# Patient Record
Sex: FEMALE | Race: WHITE | Employment: FULL TIME | URBAN - METROPOLITAN AREA
[De-identification: names, ages, dates, MRNs, and addresses within clinical notes are randomized per-mention and may not be internally consistent; named-entity substitution may affect disease eponyms.]

---

## 2017-07-07 PROCEDURE — 88305 TISSUE EXAM BY PATHOLOGIST: CPT | Performed by: SPECIALIST

## 2017-07-10 PROCEDURE — 82607 VITAMIN B-12: CPT | Performed by: OTHER

## 2017-07-10 PROCEDURE — 36415 COLL VENOUS BLD VENIPUNCTURE: CPT | Performed by: OTHER

## 2020-02-09 ENCOUNTER — HOSPITAL ENCOUNTER (OUTPATIENT)
Age: 55
Discharge: HOME | End: 2020-02-09
Attending: FAMILY MEDICINE
Payer: COMMERCIAL

## 2020-02-09 VITALS
OXYGEN SATURATION: 98 % | TEMPERATURE: 99 F | DIASTOLIC BLOOD PRESSURE: 79 MMHG | HEART RATE: 65 BPM | RESPIRATION RATE: 18 BRPM | SYSTOLIC BLOOD PRESSURE: 128 MMHG

## 2020-02-09 DIAGNOSIS — Z20.828 EXPOSURE TO THE FLU: Primary | ICD-10-CM

## 2020-02-09 DIAGNOSIS — Z86.2 HISTORY OF IMMUNOCOMPROMISED STATE: ICD-10-CM

## 2020-02-09 PROCEDURE — 99204 OFFICE O/P NEW MOD 45 MIN: CPT

## 2020-02-09 PROCEDURE — 99213 OFFICE O/P EST LOW 20 MIN: CPT

## 2020-02-09 RX ORDER — OSELTAMIVIR PHOSPHATE 75 MG/1
75 CAPSULE ORAL DAILY
Qty: 10 CAPSULE | Refills: 0 | Status: SHIPPED | OUTPATIENT
Start: 2020-02-09 | End: 2020-02-19

## 2020-02-09 NOTE — ED PROVIDER NOTES
Patient Seen in: [address] [facility]      History   Patient presents with:   Other    Stated Complaint: fever    HPI  This is a 48-year-old female coming in with complaints of being exposed to the flu, she clearly denies fever, chills, however HEAD: Normocephalic, atraumatic  EENT: OP - wnl, moist, Nares normal  NECK: FROM, supple  LUNGS: CTAB, no RRW  CV: RRR  ABD: not distended  NEURO: Alert and oriented to person place and time  GAIT: Normal          ED Course   Labs Reviewed - No data to d

## 2020-03-18 NOTE — LETTER
----- Message from Heaven Nuñez MD sent at 3/18/2020  6:50 AM CDT -----  B12 within normal range however is on the low normal and, take B12 1000 mcg orally daily  Slightly elevated bilirubin, repeat liver enzymes with anti liver kidney antibody, chronic hepatitis panel, anti smooth muscle antibody PT PTT, and abdominal ultrasound.  These test are not urgent, can be done within a month   [facility]  [facility]  [place] [address]  [address]: [phone]  FAX: [phone]    Medical Clearance Request    Jesus Pizarro MD  [address] [facility] [place] [facility] [address]  Via Fax: [phone]    The patient listed below is scheduled for surgery and needs the following pre-op labs and/or clearance prior to surgery. The patient has been instructed to schedule an appointment with you for a pre-op physical.      Patient: Kim Osborn  : 1965    Surgeon:  Dr. Sunita Calvillo    Procedure: Wide excision melanoma left foot    Surgery Date:  TBD  Location:  [facility]    outpatient    [] Hematocrit/Hemogram [x] EKG     [x] CBC    [] Chest X-Ray    [x] CMP    [] PT/PTT    [] Urinalysis   [] MRSA Nasal Culture    [] Urinalysis with reflex  [] History and Physical    [] Urine pregnancy  [x] Medical Clearance    [] Qualitative HCG (blood) [] Cardiac Clearance      Please fax to fax number indicated above when completed. Call [phone] with any questions. Thank you for your prompt attention to these requirements.     [address] [facility]

## 2020-10-06 PROBLEM — G43.009 MIGRAINE WITHOUT AURA AND WITHOUT STATUS MIGRAINOSUS, NOT INTRACTABLE: Status: ACTIVE | Noted: 2020-10-06

## 2022-01-20 ENCOUNTER — WALK IN (OUTPATIENT)
Dept: URGENT CARE | Age: 57
End: 2022-01-20

## 2022-01-20 VITALS
DIASTOLIC BLOOD PRESSURE: 78 MMHG | RESPIRATION RATE: 16 BRPM | SYSTOLIC BLOOD PRESSURE: 122 MMHG | HEART RATE: 71 BPM | TEMPERATURE: 97.5 F | OXYGEN SATURATION: 99 %

## 2022-01-20 DIAGNOSIS — J06.9 ACUTE UPPER RESPIRATORY INFECTION, UNSPECIFIED: ICD-10-CM

## 2022-01-20 DIAGNOSIS — Z20.822 SUSPECTED COVID-19 VIRUS INFECTION: Primary | ICD-10-CM

## 2022-01-20 PROCEDURE — U0003 INFECTIOUS AGENT DETECTION BY NUCLEIC ACID (DNA OR RNA); SEVERE ACUTE RESPIRATORY SYNDROME CORONAVIRUS 2 (SARS-COV-2) (CORONAVIRUS DISEASE [COVID-19]), AMPLIFIED PROBE TECHNIQUE, MAKING USE OF HIGH THROUGHPUT TECHNOLOGIES AS DESCRIBED BY CMS-2020-01-R: HCPCS | Performed by: CLINICAL MEDICAL LABORATORY

## 2022-01-20 PROCEDURE — U0005 INFEC AGEN DETEC AMPLI PROBE: HCPCS | Performed by: CLINICAL MEDICAL LABORATORY

## 2022-01-20 PROCEDURE — U0003 INFECTIOUS AGENT DETECTION BY NUCLEIC ACID (DNA OR RNA); SEVERE ACUTE RESPIRATORY SYNDROME CORONAVIRUS 2 (SARS-COV-2) (CORONAVIRUS DISEASE [COVID-19]), AMPLIFIED PROBE TECHNIQUE, MAKING USE OF HIGH THROUGHPUT TECHNOLOGIES AS DESCRIBED BY CMS-2020-01-R: HCPCS | Performed by: FAMILY MEDICINE

## 2022-01-20 PROCEDURE — PSEU10635 2019 NOVEL CORONAVIRUS (SARS-COV-2): Performed by: CLINICAL MEDICAL LABORATORY

## 2022-01-20 PROCEDURE — 99214 OFFICE O/P EST MOD 30 MIN: CPT | Performed by: INTERNAL MEDICINE

## 2022-01-20 PROCEDURE — U0005 INFEC AGEN DETEC AMPLI PROBE: HCPCS | Performed by: FAMILY MEDICINE

## 2022-01-20 RX ORDER — CODEINE PHOSPHATE/GUAIFENESIN 10-100MG/5
5 LIQUID (ML) ORAL 3 TIMES DAILY PRN
Qty: 180 ML | Refills: 0 | Status: SHIPPED | OUTPATIENT
Start: 2022-01-20

## 2022-01-20 RX ORDER — TERIFLUNOMIDE 7 MG/1
7 TABLET, FILM COATED ORAL DAILY
COMMUNITY

## 2022-01-20 RX ORDER — CEFDINIR 300 MG/1
300 CAPSULE ORAL 2 TIMES DAILY
Qty: 20 CAPSULE | Refills: 0 | Status: SHIPPED | OUTPATIENT
Start: 2022-01-20 | End: 2022-01-30

## 2022-01-20 RX ORDER — PREDNISONE 50 MG/1
50 TABLET ORAL DAILY
Qty: 3 TABLET | Refills: 0 | Status: SHIPPED | OUTPATIENT
Start: 2022-01-20 | End: 2022-01-23

## 2022-01-21 ENCOUNTER — LAB REQUISITION (OUTPATIENT)
Dept: LAB | Age: 57
End: 2022-01-21

## 2022-01-21 DIAGNOSIS — Z20.822 CONTACT WITH AND (SUSPECTED) EXPOSURE TO COVID-19: ICD-10-CM

## 2022-01-22 LAB
SARS-COV-2 RNA RESP QL NAA+PROBE: NOT DETECTED
SARS-COV-2 RNA RESP QL NAA+PROBE: NOT DETECTED
SERVICE CMNT-IMP: NORMAL

## 2022-10-21 PROBLEM — S59.901A INJURY OF RIGHT ELBOW: Status: ACTIVE | Noted: 2018-05-08

## 2022-10-21 PROBLEM — E78.00 HIGH BLOOD CHOLESTEROL: Status: ACTIVE | Noted: 2022-10-21

## 2022-10-26 ENCOUNTER — OFFICE VISIT (OUTPATIENT)
Dept: SURGERY | Facility: CLINIC | Age: 57
End: 2022-10-26
Payer: COMMERCIAL

## 2022-10-26 VITALS
BODY MASS INDEX: 25 KG/M2 | OXYGEN SATURATION: 96 % | WEIGHT: 153 LBS | SYSTOLIC BLOOD PRESSURE: 150 MMHG | TEMPERATURE: 98 F | RESPIRATION RATE: 16 BRPM | HEART RATE: 71 BPM | DIASTOLIC BLOOD PRESSURE: 77 MMHG

## 2022-10-26 DIAGNOSIS — D03.72 MELANOMA IN SITU OF LOWER EXTREMITY, LEFT (HCC): Primary | ICD-10-CM

## 2022-10-26 DIAGNOSIS — G35 MS (MULTIPLE SCLEROSIS) (HCC): ICD-10-CM

## 2022-10-26 DIAGNOSIS — Z01.818 PRE-OP TESTING: ICD-10-CM

## 2022-10-26 PROBLEM — S59.901A INJURY OF RIGHT ELBOW: Status: RESOLVED | Noted: 2018-05-08 | Resolved: 2022-10-26

## 2022-10-26 PROCEDURE — 99243 OFF/OP CNSLTJ NEW/EST LOW 30: CPT | Performed by: SURGERY

## 2022-10-26 PROCEDURE — 3078F DIAST BP <80 MM HG: CPT | Performed by: SURGERY

## 2022-10-26 PROCEDURE — 11104 PUNCH BX SKIN SINGLE LESION: CPT | Performed by: SURGERY

## 2022-10-26 PROCEDURE — 3077F SYST BP >= 140 MM HG: CPT | Performed by: SURGERY

## 2022-10-26 PROCEDURE — 88305 TISSUE EXAM BY PATHOLOGIST: CPT | Performed by: SURGERY

## 2022-10-26 PROCEDURE — 99245 OFF/OP CONSLTJ NEW/EST HI 55: CPT | Performed by: SURGERY

## 2022-10-26 NOTE — PATIENT INSTRUCTIONS
Surgeon:              Dr. Thien Marquez. Lizabeth Cruz, PhD                                          Tel:         [phone]                                  Fax:        [phone]    Surgery/Procedure:     Reconstruction of left medial plantar foot with local tissue rearrangement, flap, possible skin graft, possible integra  1 hour for Dr. Bhumika Ardon portion, general anesthesia, outpatient, joint with Dr. Josiane Bautista of left medial plantar foot with skin graft  1.25 hours for Dr. Bhumika Ardon portion, general anesthesia, outpatient, 3 weeks after first procedure                                      Hospital:  [facility]: [address] [place], [place] [address]vd, [place], [address]           [phone]  [facility]: [address] [facility], [facility]               [phone]    1. Someone will need to drive you to and from the hospital if your procedure is outpatient. 2.Do not drink alcohol or smoke 24 hours prior to your procedure. 3. Bring a picture ID and your insurance card. 4. You will be contacted by the hospital the day before to confirm the procedure time and location. 5. The hospital will also contact you approximately one week before surgery to schedule your COVID test.     6. Do not take any herbal supplements or blood thinners at least one week before your procedure/surgery. This includes NSAID's (aspirin, baby aspirin, Motrin, Ibuprofen, Aleve, Advil, Naproxen, etc), Plavix, fish oil, vitamin E, turmeric, CoQ10, or green tea supplements, etc. *TYLENOL or acetaminophen is ok to take*    7. PRE-OPERATIVE TESTING: History and physical with medical clearance is REQUIRED within 30 days of the surgery date and is mandatory per Dr. Lizabeth Cruz. *If this is not done, your surgery will be postponed*  [address]  [facility]  CMP  EKG  Instructions regarding Teriflunomide from Dr. Gretchen Heimlich    8.  Please inform us if you develop any Covid-19 like symptoms, test positive or have been exposed for Covid- 19 prior to surgery.      Consent obtained for repair of skin defects with skin flaps and skin graft  Photos taken on 10/26/2022

## 2022-10-26 NOTE — PATIENT INSTRUCTIONS
Surgery: Wide excision melanoma left foot and reconstruction with Dr. Jan Boothe    Date of Surgery:  [facility]    Hospital:    [facility] [address] [place], [address]  Phone: [phone]    This is an Outpatient procedure. Use the provided Chlorhexadine surgical soap(instructions attached) to shower the night before and morning of your procedure. Do not apply powders, creams, lotions or deodorant after showering. Do not apply any kind of makeup and make sure to remove nail polish prior to your surgery. For faster recovery from anesthesia and surgery please follow the instructions below regarding your pre-op diet:  12 hours prior to your surgery time you are to drink one 10oz bottle of Ensure Pre-Surgery Drink. You are to have NO solid food or water after 11pm the night before your surgery EXCEPT one additional 10oz bottle of Ensure Pre-Surgery Drink. You need to finish drinking this 4 hours prior to surgery time. Take Tylenol 1000mg by mouth at the time of your second Ensure Pre-Surgery drink(4hrs prior to surgery time), unless instructed otherwise by your surgeon. Bowel Prep: No   If you take Insulin contact your primary care physician for specific instructions regarding dosing around your surgery. Do not drink alcohol or smoke tobacco products 24 hours prior to procedure. Bring your picture ID and insurance card with you. Wear comfortable clothing that can easily be removed. Preferably, something that zips, snaps, or buttons up the front. You will be contacted by the hospital  for pre-admission COVID-19 testing and the day prior to your surgery to confirm details and give you specific instructions about when and where to arrive the day of your procedure.    If you are taking blood thinners including: Plavix, Eliquis, Xarelto, Coumadin, full strength Aspirin you will need to contact the prescribing provider for specific instructions on holding these medications for your procedure. Inform your primary care physician of your surgery and ask if him/her will need to see you prior to surgery. If you develop symptoms of another illness prior to surgery please contact our office immediately. If this is an inpatient surgery, attending the Surgical Oncology Pre-operative Education Class is strongly encouraged. Email [email or web address] to RSVP or for more class information. Pre-Operative Testing  x CBC x CMP  BMP    PT, PTT, INR  UA x EKG    Chest X-Ray  Cardiac Clearance x H & P Medical Clearance     Maycol Brown MD, FACS  Chief of Surgical Oncology  Co-Medical Director, Solitario Quick  Associate Professor of Surgery    Dr. Jaon Amos nurse line: [phone]   Monday through Friday 8:00am to 4:30pm.     For Dr. Joan Amos office: [phone]/ Fax: [phone]  After hours you will reach the answering service     Central [phone]  Medical Records:   [phone]

## 2022-10-28 ENCOUNTER — TELEPHONE (OUTPATIENT)
Dept: SURGERY | Facility: CLINIC | Age: 57
End: 2022-10-28

## 2022-10-28 NOTE — TELEPHONE ENCOUNTER
PATH:  Skin, left foot, excision:  -Residual melanoma in situ.  -The margins of excision are narrowly negative for tumor (see comment). -Scar, consistent with prior procedure.  -No evidence of invasive tumor. Discussed with patient over phone.

## 2022-10-31 ENCOUNTER — TELEPHONE (OUTPATIENT)
Dept: SURGERY | Facility: CLINIC | Age: 57
End: 2022-10-31

## 2022-10-31 DIAGNOSIS — D03.72 MELANOMA IN SITU OF LOWER EXTREMITY, LEFT (HCC): Primary | ICD-10-CM

## 2022-10-31 NOTE — TELEPHONE ENCOUNTER
Calling to discuss surgery scheduling. Offered patient surgery date of 11/22/2022 at the [facility] with Dr. Ever Lazaro and Dr. Guillermina Adame. Offered a 2nd stage surgery date of 12/29/2022, also at the [facility], with Dr. Guillermina Adame. Patient agreed to location and dates.

## 2022-11-01 ENCOUNTER — TELEPHONE (OUTPATIENT)
Dept: SURGERY | Facility: CLINIC | Age: 57
End: 2022-11-01

## 2022-11-01 NOTE — TELEPHONE ENCOUNTER
Called [facility] to see if they have sent ou the slides on the patient.  Per the person in pathology they will be sending it out today,

## 2022-11-03 DIAGNOSIS — D03.72 MELANOMA IN SITU OF LOWER EXTREMITY, LEFT (HCC): Primary | ICD-10-CM

## 2022-11-08 DIAGNOSIS — D03.72 MELANOMA IN SITU OF LOWER EXTREMITY, LEFT (HCC): Primary | ICD-10-CM

## 2022-11-10 ENCOUNTER — LAB ENCOUNTER (OUTPATIENT)
Dept: LAB | Facility: HOSPITAL | Age: 57
End: 2022-11-10
Attending: SURGERY
Payer: COMMERCIAL

## 2022-11-10 ENCOUNTER — APPOINTMENT (OUTPATIENT)
Dept: LAB | Facility: HOSPITAL | Age: 57
End: 2022-11-10
Attending: SURGERY
Payer: COMMERCIAL

## 2022-11-10 DIAGNOSIS — D03.9 MELANOMA IN SITU (HCC): Primary | ICD-10-CM

## 2022-11-11 PROCEDURE — 88321 CONSLTJ&REPRT SLD PREP ELSWR: CPT

## 2022-11-20 ENCOUNTER — LAB ENCOUNTER (OUTPATIENT)
Dept: LAB | Facility: HOSPITAL | Age: 57
End: 2022-11-20
Attending: SURGERY
Payer: COMMERCIAL

## 2022-11-20 DIAGNOSIS — Z01.812 ENCOUNTER FOR PREOPERATIVE SCREENING LABORATORY TESTING FOR COVID-19 VIRUS: ICD-10-CM

## 2022-11-20 DIAGNOSIS — Z20.822 ENCOUNTER FOR PREOPERATIVE SCREENING LABORATORY TESTING FOR COVID-19 VIRUS: ICD-10-CM

## 2022-11-21 DIAGNOSIS — D03.72 MELANOMA IN SITU OF LOWER EXTREMITY, LEFT (HCC): Primary | ICD-10-CM

## 2022-11-21 LAB — SARS-COV-2 RNA RESP QL NAA+PROBE: NOT DETECTED

## 2022-11-22 ENCOUNTER — HOSPITAL ENCOUNTER (OUTPATIENT)
Facility: HOSPITAL | Age: 57
Setting detail: HOSPITAL OUTPATIENT SURGERY
Discharge: HOME | End: 2022-11-22
Attending: SURGERY | Admitting: SURGERY
Payer: COMMERCIAL

## 2022-11-22 ENCOUNTER — ANESTHESIA (OUTPATIENT)
Dept: SURGERY | Facility: HOSPITAL | Age: 57
End: 2022-11-22
Payer: COMMERCIAL

## 2022-11-22 ENCOUNTER — ANESTHESIA EVENT (OUTPATIENT)
Dept: SURGERY | Facility: HOSPITAL | Age: 57
End: 2022-11-22
Payer: COMMERCIAL

## 2022-11-22 VITALS
WEIGHT: 156.06 LBS | OXYGEN SATURATION: 93 % | TEMPERATURE: 98 F | HEART RATE: 68 BPM | DIASTOLIC BLOOD PRESSURE: 69 MMHG | RESPIRATION RATE: 16 BRPM | BODY MASS INDEX: 26 KG/M2 | SYSTOLIC BLOOD PRESSURE: 120 MMHG | HEIGHT: 65 IN

## 2022-11-22 DIAGNOSIS — Z20.822 ENCOUNTER FOR PREOPERATIVE SCREENING LABORATORY TESTING FOR COVID-19 VIRUS: Primary | ICD-10-CM

## 2022-11-22 DIAGNOSIS — Z01.812 ENCOUNTER FOR PREOPERATIVE SCREENING LABORATORY TESTING FOR COVID-19 VIRUS: Primary | ICD-10-CM

## 2022-11-22 DIAGNOSIS — D03.72 MELANOMA IN SITU OF LOWER EXTREMITY, LEFT (HCC): ICD-10-CM

## 2022-11-22 PROCEDURE — 88305 TISSUE EXAM BY PATHOLOGIST: CPT | Performed by: SURGERY

## 2022-11-22 PROCEDURE — 0JRR07Z REPLACEMENT OF LEFT FOOT SUBCUTANEOUS TISSUE AND FASCIA WITH AUTOLOGOUS TISSUE SUBSTITUTE, OPEN APPROACH: ICD-10-PCS | Performed by: SURGERY

## 2022-11-22 PROCEDURE — 0JBR0ZX EXCISION OF LEFT FOOT SUBCUTANEOUS TISSUE AND FASCIA, OPEN APPROACH, DIAGNOSTIC: ICD-10-PCS | Performed by: SURGERY

## 2022-11-22 RX ORDER — DIPHENHYDRAMINE HYDROCHLORIDE 50 MG/ML
INJECTION INTRAMUSCULAR; INTRAVENOUS AS NEEDED
Status: DISCONTINUED | OUTPATIENT
Start: 2022-11-22 | End: 2022-11-22 | Stop reason: SURG

## 2022-11-22 RX ORDER — DIPHENHYDRAMINE HYDROCHLORIDE 50 MG/ML
12.5 INJECTION INTRAMUSCULAR; INTRAVENOUS AS NEEDED
Status: DISCONTINUED | OUTPATIENT
Start: 2022-11-22 | End: 2022-11-22

## 2022-11-22 RX ORDER — ACETAMINOPHEN 500 MG
1000 TABLET ORAL ONCE AS NEEDED
Status: DISCONTINUED | OUTPATIENT
Start: 2022-11-22 | End: 2022-11-22

## 2022-11-22 RX ORDER — HYDROMORPHONE HYDROCHLORIDE 1 MG/ML
INJECTION, SOLUTION INTRAMUSCULAR; INTRAVENOUS; SUBCUTANEOUS
Status: COMPLETED
Start: 2022-11-22 | End: 2022-11-22

## 2022-11-22 RX ORDER — HYDROMORPHONE HYDROCHLORIDE 1 MG/ML
0.6 INJECTION, SOLUTION INTRAMUSCULAR; INTRAVENOUS; SUBCUTANEOUS EVERY 5 MIN PRN
Status: DISCONTINUED | OUTPATIENT
Start: 2022-11-22 | End: 2022-11-22

## 2022-11-22 RX ORDER — HYDROMORPHONE HYDROCHLORIDE 1 MG/ML
0.2 INJECTION, SOLUTION INTRAMUSCULAR; INTRAVENOUS; SUBCUTANEOUS EVERY 5 MIN PRN
Status: DISCONTINUED | OUTPATIENT
Start: 2022-11-22 | End: 2022-11-22

## 2022-11-22 RX ORDER — CEFAZOLIN SODIUM/WATER 2 G/20 ML
2 SYRINGE (ML) INTRAVENOUS ONCE
Status: COMPLETED | OUTPATIENT
Start: 2022-11-22 | End: 2022-11-22

## 2022-11-22 RX ORDER — ONDANSETRON 2 MG/ML
4 INJECTION INTRAMUSCULAR; INTRAVENOUS EVERY 6 HOURS PRN
Status: DISCONTINUED | OUTPATIENT
Start: 2022-11-22 | End: 2022-11-22

## 2022-11-22 RX ORDER — ONDANSETRON 2 MG/ML
INJECTION INTRAMUSCULAR; INTRAVENOUS AS NEEDED
Status: DISCONTINUED | OUTPATIENT
Start: 2022-11-22 | End: 2022-11-22 | Stop reason: SURG

## 2022-11-22 RX ORDER — HYDROCODONE BITARTRATE AND ACETAMINOPHEN 5; 325 MG/1; MG/1
1 TABLET ORAL ONCE AS NEEDED
Status: DISCONTINUED | OUTPATIENT
Start: 2022-11-22 | End: 2022-11-22

## 2022-11-22 RX ORDER — HYDROCODONE BITARTRATE AND ACETAMINOPHEN 5; 325 MG/1; MG/1
2 TABLET ORAL ONCE AS NEEDED
Status: DISCONTINUED | OUTPATIENT
Start: 2022-11-22 | End: 2022-11-22

## 2022-11-22 RX ORDER — SCOLOPAMINE TRANSDERMAL SYSTEM 1 MG/1
1 PATCH, EXTENDED RELEASE TRANSDERMAL ONCE
Status: DISCONTINUED | OUTPATIENT
Start: 2022-11-22 | End: 2022-11-22

## 2022-11-22 RX ORDER — ACETAMINOPHEN AND CODEINE PHOSPHATE 300; 30 MG/1; MG/1
1 TABLET ORAL EVERY 4 HOURS PRN
Qty: 10 TABLET | Refills: 0 | Status: SHIPPED | OUTPATIENT
Start: 2022-11-22

## 2022-11-22 RX ORDER — LIDOCAINE HYDROCHLORIDE 10 MG/ML
INJECTION, SOLUTION EPIDURAL; INFILTRATION; INTRACAUDAL; PERINEURAL AS NEEDED
Status: DISCONTINUED | OUTPATIENT
Start: 2022-11-22 | End: 2022-11-22 | Stop reason: SURG

## 2022-11-22 RX ORDER — DEXAMETHASONE SODIUM PHOSPHATE 4 MG/ML
VIAL (ML) INJECTION AS NEEDED
Status: DISCONTINUED | OUTPATIENT
Start: 2022-11-22 | End: 2022-11-22 | Stop reason: SURG

## 2022-11-22 RX ORDER — NALOXONE HYDROCHLORIDE 0.4 MG/ML
80 INJECTION, SOLUTION INTRAMUSCULAR; INTRAVENOUS; SUBCUTANEOUS AS NEEDED
Status: DISCONTINUED | OUTPATIENT
Start: 2022-11-22 | End: 2022-11-22

## 2022-11-22 RX ORDER — MIDAZOLAM HYDROCHLORIDE 1 MG/ML
1 INJECTION INTRAMUSCULAR; INTRAVENOUS EVERY 5 MIN PRN
Status: DISCONTINUED | OUTPATIENT
Start: 2022-11-22 | End: 2022-11-22

## 2022-11-22 RX ORDER — BUPIVACAINE HYDROCHLORIDE 2.5 MG/ML
INJECTION, SOLUTION EPIDURAL; INFILTRATION; INTRACAUDAL AS NEEDED
Status: DISCONTINUED | OUTPATIENT
Start: 2022-11-22 | End: 2022-11-22 | Stop reason: HOSPADM

## 2022-11-22 RX ORDER — SODIUM CHLORIDE, SODIUM LACTATE, POTASSIUM CHLORIDE, CALCIUM CHLORIDE 600; 310; 30; 20 MG/100ML; MG/100ML; MG/100ML; MG/100ML
INJECTION, SOLUTION INTRAVENOUS CONTINUOUS
Status: DISCONTINUED | OUTPATIENT
Start: 2022-11-22 | End: 2022-11-22

## 2022-11-22 RX ORDER — HYDROMORPHONE HYDROCHLORIDE 1 MG/ML
0.4 INJECTION, SOLUTION INTRAMUSCULAR; INTRAVENOUS; SUBCUTANEOUS EVERY 5 MIN PRN
Status: DISCONTINUED | OUTPATIENT
Start: 2022-11-22 | End: 2022-11-22

## 2022-11-22 RX ORDER — MIDAZOLAM HYDROCHLORIDE 1 MG/ML
INJECTION INTRAMUSCULAR; INTRAVENOUS AS NEEDED
Status: DISCONTINUED | OUTPATIENT
Start: 2022-11-22 | End: 2022-11-22 | Stop reason: SURG

## 2022-11-22 RX ORDER — PROCHLORPERAZINE EDISYLATE 5 MG/ML
5 INJECTION INTRAMUSCULAR; INTRAVENOUS EVERY 8 HOURS PRN
Status: DISCONTINUED | OUTPATIENT
Start: 2022-11-22 | End: 2022-11-22

## 2022-11-22 RX ORDER — ACETAMINOPHEN 500 MG
1000 TABLET ORAL ONCE
Status: DISCONTINUED | OUTPATIENT
Start: 2022-11-22 | End: 2022-11-22 | Stop reason: HOSPADM

## 2022-11-22 RX ADMIN — DEXAMETHASONE SODIUM PHOSPHATE 8 MG: 4 MG/ML VIAL (ML) INJECTION at 10:18:00

## 2022-11-22 RX ADMIN — MIDAZOLAM HYDROCHLORIDE 2 MG: 1 INJECTION INTRAMUSCULAR; INTRAVENOUS at 10:07:00

## 2022-11-22 RX ADMIN — SODIUM CHLORIDE, SODIUM LACTATE, POTASSIUM CHLORIDE, CALCIUM CHLORIDE: 600; 310; 30; 20 INJECTION, SOLUTION INTRAVENOUS at 11:14:00

## 2022-11-22 RX ADMIN — DIPHENHYDRAMINE HYDROCHLORIDE 12.5 MG: 50 INJECTION INTRAMUSCULAR; INTRAVENOUS at 10:12:00

## 2022-11-22 RX ADMIN — SODIUM CHLORIDE, SODIUM LACTATE, POTASSIUM CHLORIDE, CALCIUM CHLORIDE: 600; 310; 30; 20 INJECTION, SOLUTION INTRAVENOUS at 10:03:00

## 2022-11-22 RX ADMIN — CEFAZOLIN SODIUM/WATER 2 G: 2 G/20 ML SYRINGE (ML) INTRAVENOUS at 10:09:00

## 2022-11-22 RX ADMIN — LIDOCAINE HYDROCHLORIDE 50 MG: 10 INJECTION, SOLUTION EPIDURAL; INFILTRATION; INTRACAUDAL; PERINEURAL at 10:07:00

## 2022-11-22 RX ADMIN — ONDANSETRON 4 MG: 2 INJECTION INTRAMUSCULAR; INTRAVENOUS at 10:52:00

## 2022-11-22 NOTE — INTERVAL H&P NOTE
There has been no significant change since I saw patient as documented in [facility]. Surgery revisted. To proceed as planned. Kamilah Cohen MD FACS

## 2022-11-22 NOTE — DISCHARGE INSTRUCTIONS
Keep dressing in place and dry  Keep leg elevated as much as possible   Ok to walk full  weight baring but walk on heel use surgical shoe as needed    F u with PA next monday

## 2022-11-22 NOTE — INTERVAL H&P NOTE
There has been no significant change since the patient was seen as documented in EPIC. Surgery revisted. To proceed as planned.       Ellie Nazario PA-C

## 2022-11-22 NOTE — BRIEF OP NOTE
Pre-Operative Diagnosis: Melanoma in situ of lower extremity, left ([facility] [D03.72]     Post-Operative Diagnosis: Melanoma in situ of lower extremity, left ([address] [D03.72]      Procedure Performed: Wide excision melanoma left foot in-situ (Dr. Niko Leone) Reconstruction of left medial plantar foot with local tissue rearrangement, skin graft (Dr. Jan Boothe)    Surgeon(s) and Role:  Panel 1:     * Maddy Elam MD - Primary  Panel 2:     * Luke Headley MD - Primary    Assistant(s):  Surgical Assistant.: Vangie Elliott     Surgical Findings:see dictation   Specimen: see path   Estimated Blood Loss: Blood Output: 10 mL (11/22/2022 11:12 AM)      Dictation Number: [id number]  Kimberley Boothe MD  11/22/2022  11:42 AM

## 2022-11-22 NOTE — ANESTHESIA POSTPROCEDURE EVALUATION
[address] [facility] Patient Status:  Hospital Outpatient Surgery   Age/Gender 61year old female MRN [id number]   [facility] SURGERY Attending Jyoti Hamilton MD   Hosp Day # 0 PCP Georgina Gamboa       Anesthesia Post-op Note    Wide excision melanoma left foot in-situ (Dr. Georgina Blas) Reconstruction of left medial plantar foot with local tissue rearrangement, skin graft (Dr. Radha Chao)    Procedure Summary     Date: 11/22/22 Room / Location: [facility] MAIN OR 06 / [facility] MAIN OR    Anesthesia Start: 1001 Anesthesia Stop: 9988    Procedures: Wide excision melanoma left foot in-situ (Dr. Georgina Blas) Reconstruction of left medial plantar foot with local tissue rearrangement, skin graft (Dr. Radha Chao) (Left: Foot)      Reconstruction of left medial plantar foot with local tissue rearrangement, flap, possible skin graft, possible integra (Dr. Radha Chao) (Left: Foot) Diagnosis:       Melanoma in situ of lower extremity, left ([address]      (Melanoma in situ of lower extremity, left ([facility] [D03.72])    Surgeons: Jyoti Hamilton MD; Sheryl Robles MD Anesthesiologist: Aishwarya Brizuela MD    Anesthesia Type: general ASA Status: 2          Anesthesia Type: general    Vitals Value Taken Time   /79 11/22/22 1119   Temp 97.9 11/22/22 1122   Pulse 73 11/22/22 1121   Resp 15 11/22/22 1121   SpO2 98 % 11/22/22 1121   Vitals shown include unvalidated device data. Patient Location: PACU    Anesthesia Type: general    Airway Patency: patent    Postop Pain Control: adequate    Mental Status: mildly sedated but able to meaningfully participate in the post-anesthesia evaluation    Nausea/Vomiting: none    Cardiopulmonary/Hydration status: stable euvolemic    Complications: no apparent anesthesia related complications    Postop vital signs: stable    Dental Exam: Unchanged from Preop    Patient to be discharged from PACU when criteria met.

## 2022-11-22 NOTE — ANESTHESIA PROCEDURE NOTES
Airway  Date/Time: 11/22/2022 10:08 AM  Urgency: elective      General Information and Staff    Patient location during procedure: OR  Anesthesiologist: Shahbaz Westbrook MD  Resident/CRNA: Lindsay Retana CRNA  Performed: CRNA     Indications and Patient Condition  Indications for airway management: anesthesia  Sedation level: deep  Preoxygenated: yes  Patient position: sniffing  Mask difficulty assessment: 1 - vent by mask    Final Airway Details  Final airway type: supraglottic airway      Successful airway: classic  Size 3       Number of attempts at approach: 1

## 2022-11-22 NOTE — BRIEF OP NOTE
Pre-Operative Diagnosis: Melanoma in situ of lower extremity, left ([facility] [address] [D03.72]     Post-Operative Diagnosis: Melanoma in situ of lower extremity, left ([address] [D03.72]      Procedure Performed:    Wide excision melanoma left foot in-situ     Surgeon(s) and Role:     Corrinne Pancoast, MD      Surgical Findings: as expected     Specimen: yes     Estimated Blood Loss: 2 ml Edilberto Nathan  11/22/2022  10:55 AM

## 2022-11-23 ENCOUNTER — TELEPHONE (OUTPATIENT)
Dept: SURGERY | Facility: CLINIC | Age: 57
End: 2022-11-23

## 2022-11-23 DIAGNOSIS — D03.72 MELANOMA IN SITU OF LOWER EXTREMITY, LEFT (HCC): Primary | ICD-10-CM

## 2022-11-23 NOTE — TELEPHONE ENCOUNTER
Called to check on patient after procedure yesterday. Pt stated that she is having a lot of pain and discomfort. Pt stated she has been keeping her leg elevated, but she has continuous throbbing. Pt taking tylenol 3 every 4 hours. Had patient add motrin to her pain regimen. Called patient later in day to check on and pt stated her pain is relieved after taking the motrin. Pt stated she will continue with the motrin. Confirmed post op appointment with patient for Monday.

## 2022-11-23 NOTE — OPERATIVE REPORT
[facility]    PATIENT'S NAME: Adrianna Phalen   ATTENDING PHYSICIAN: Yelena Lucas MD   OPERATING PHYSICIAN: Kimberley Aaron MD   PATIENT ACCOUNT#:   [id number]    LOCATION:  [address] [place] [address]  MEDICAL RECORD #:   [id number]       YOB: 1965  ADMISSION DATE:       11/22/2022      OPERATION DATE:  11/22/2022    OPERATIVE REPORT    PREOPERATIVE DIAGNOSIS:  Melanoma in situ left plantar foot, open wound left plantar foot. POSTOPERATIVE DIAGNOSIS:  Melanoma in situ left plantar foot, open wound left plantar foot. PROCEDURE:  Full-thickness skin graft from left groin to left foot 2 sq cm, local tissue rearrangement 1 sq cm. ASSISTANT:  JENELLE Ha    ANESTHESIA:  General endotracheal anesthesia. COMPLICATIONS:  None. BLOOD LOSS:  Minimal.    INDICATIONS:  This is a 79-year-old female with melanoma in situ of her left foot in the plantar aspect of the medial nonweightbearing area near the first metatarsal bone. She was seen in the office by Dr. Chuck Lucas and the excision was discussed, and I was consulted to assist with the closure due to tightness of her tissue. We discussed the option for skin graft or local tissue rearrangement or combination of both. The patient would like to have this all done in 1 stage if possible. Potential risks, complications, benefits and alternatives were discussed. Risks and complications include, but are not limited to, infection, bleeding, scarring, damage to surrounding structures, hematoma, seroma, graft failure, flap failure, wound dehiscence, need for further surgery. The patient understands and wishes to proceed. Questions were answered. OPERATIVE TECHNIQUE:  Patient was identified in the preoperative area, informed consent was obtained, and the site was marked. Patient was then brought back to the operating room and placed in supine position. She was adequately padded, and sequential compression devices were applied. General endotracheal anesthesia was administered. Perioperative antibiotics were given. Then, her left leg and foot and groin were prepped and draped in the usual sterile fashion. Then the procedure was started with Dr. Gutierrez Mendoza portion which will be dictated separately by him. I was called into the room after he had completed his portion. At that time, there was a defect measuring 2 x 2 cm on the left plantar medial foot. The tissue was evaluated, and there was minimal laxity in that area. The deep area overlying the flexor hallucis longus tendon had good paratenon with some depth in that area; therefore, a local tissue rearrangement in that area was performed with advancing the flap from the plantar medial aspect 1 cm over. A 15 blade was used to make that backcut incision. This was then secured in place with a 4-0 Vicryl suture and a 4-0 Prolene suture. The remaining defect size then was slightly smaller, and the skin graft was marked and harvested from the left groin area. This was then defatted, and small puncture wounds were placed, and the skin graft was placed onto the defect and secured with 5-0 chromic sutures circumferentially and centrally. A Xeroform bolster was applied and secured with 4-0 silk sutures. Then fluffs, Kerlix, Ace bandage and a surgical shoe were applied. The donor site was closed with 4-0 interrupted Vicryl sutures for the deep dermal layer, followed by 4-0 Monocryl subcuticular sutures for the skin, Exofin glue and Steri-Strips. The patient tolerated the procedure well and awoke from anesthesia without difficulty. All sponge, instrument, and needle counts were correct at the end of the case. Dictated By Will Hernandez.  Zach Gilmore MD  d: 11/22/2022 17:42:27  t: 11/22/2022 21:47:46  Job [id number]  EOX/

## 2022-11-26 NOTE — OPERATIVE REPORT
[address] [place]    PATIENT'S NAME: Josh Sauceda   ATTENDING PHYSICIAN: Nohelia Kapadia. Niko Leone MD   OPERATING PHYSICIAN: Nohelia Kapadia. Niko Leone MD   PATIENT ACCOUNT#:   [id number]    LOCATION:  [address]  MEDICAL RECORD #:   [id number]       YOB: 1965  ADMISSION DATE:       11/22/2022      OPERATION DATE:  11/22/2022    OPERATIVE REPORT      PREOPERATIVE DIAGNOSIS:  Melanoma in situ, left foot. POSTOPERATIVE DIAGNOSIS:  Melanoma in situ, left foot. PROCEDURE:    1. Wide excision melanoma in situ, left foot (1.5 cm). 2.   Reconstruction with Dr. Jan Boothe, Plastic Surgery. ASSISTANTS:  Bushra Crow CSA, and Alexsander Linares. [place], [place].      ANESTHESIA:  General.    INDICATIONS:  Patient is a 21-year-old woman who was diagnosed with melanoma in situ of the left foot. She presents today for wide excision and reconstruction. The surgical treatment matter had been discussed with her including risks at length. OPERATIVE TECHNIQUE:  Patient was brought to the operating room and was placed in supine position. She was given general anesthesia by the anesthesiology service. Sequential compression boots were placed. Patient received preoperative intravenous antibiotic prophylaxis. She was prepped and draped in the normal sterile fashion. A 0.5 cm margin was marked in the foot medial aspect and incision measuring 1.5 cm in width was made and deepened to underlying tendons and fascia. The specimen was excised, oriented, and sent to Pathology for permanent section evaluation. At this point, Dr. Jan Boothe presented for reconstruction and her portion of the dictation should be noted elsewhere. Patient tolerated my portion of the procedure well without immediate complications. Dictated By Marva Leone MD  d: 11/26/2022 11:57:18  t: 11/26/2022 14:50:20  Job [id number]  [facility]/

## 2022-11-28 ENCOUNTER — OFFICE VISIT (OUTPATIENT)
Dept: SURGERY | Facility: CLINIC | Age: 57
End: 2022-11-28
Payer: COMMERCIAL

## 2022-11-28 DIAGNOSIS — D03.72 MELANOMA IN SITU OF LOWER EXTREMITY, LEFT (HCC): Primary | ICD-10-CM

## 2022-11-28 PROCEDURE — 99024 POSTOP FOLLOW-UP VISIT: CPT | Performed by: PHYSICIAN ASSISTANT

## 2022-11-30 NOTE — PROGRESS NOTES
Dane Muhammad is a 61year old female who presents today for a follow-up after wide excision melanoma in situ left foot (Dr. Jean-Pierre Tejeda) and full thickness skin graft from left groin to left foot, local tissue rearrangement (Dr. Best Mccartney) on 11/22/2022. She denies fever and chills. She denies nausea, vomiting, diarrhea or constipation. Her pain is controlled. Physical Exam     Left foot skin graft adherent. No erythema or wound drainage. Left groin incision clean, dry and intact without wound drainage, wound dehiscence, erythema, seroma, hematoma. There were no vitals filed for this visit. Assessment and Plan     Dane Muhammad is doing well s/p wide excision melanoma in situ left foot (Dr. Jean-Pierre Tejeda) and full thickness skin graft from left groin to left foot, local tissue rearrangement (Dr. Best Mccartney) on 11/22/2022. The Xeroform bolster was removed from the left foot. A new dressing of Neosporin, Xeroform, Telfa, Kerlix, Ace was applied. She was instructed to changes daily. She should still not get this area wet. She should continue wearing her boot and continue with activity restrictions and elevation. A new Steri-Strip was placed on the right incision. She will follow-up with Dr. Best Mccartney in 1 week for repeat wound check. Questions were answered. Patient understands.      [place], [place]  11/30/2022  10:32 AM

## 2022-12-07 ENCOUNTER — OFFICE VISIT (OUTPATIENT)
Dept: SURGERY | Facility: CLINIC | Age: 57
End: 2022-12-07
Payer: COMMERCIAL

## 2022-12-07 DIAGNOSIS — D03.72 MELANOMA IN SITU OF LOWER EXTREMITY, LEFT (HCC): Primary | ICD-10-CM

## 2022-12-07 PROCEDURE — 99024 POSTOP FOLLOW-UP VISIT: CPT | Performed by: SURGERY

## 2023-01-04 ENCOUNTER — OFFICE VISIT (OUTPATIENT)
Dept: SURGERY | Facility: CLINIC | Age: 58
End: 2023-01-04
Payer: COMMERCIAL

## 2023-01-04 DIAGNOSIS — D03.72 MELANOMA IN SITU OF LOWER EXTREMITY, LEFT (HCC): Primary | ICD-10-CM

## 2023-01-04 PROCEDURE — 99024 POSTOP FOLLOW-UP VISIT: CPT | Performed by: SURGERY

## 2023-01-04 NOTE — PROGRESS NOTES
Dane Condon is a 61year old female who presents today for a follow-up after wide excision melanoma in situ left foot (Dr. Chantal Yuen) and full thickness skin graft from left groin to left foot, local tissue rearrangement on 11/22/2022. She denies fever and chills. She denies nausea, vomiting, diarrhea or constipation. Her pain is controlled. Physical Exam     Surgical incisions are clean, dry, and intact. No erythema, no wound drainage. Left foot skin graft healed with some crusting. No erythema or wound drainage. No swelling. There were no vitals filed for this visit. Assessment and Plan     Dane Condon is doing well s/p wide excision melanoma in situ left foot (Dr. Chantal Yuen) and full thickness skin graft from left groin to left foot, local tissue rearrangement on 11/22/2022. I recommend she apply Aquaphor daily to the graft. She can wash and shower normally. We discussed gradually returning to normal activity and hopes to return to work without restrictions on 1/17/2023. She will follow-up in 4 to 6 months. Questions were answered. Patient understands.

## 2023-05-31 ENCOUNTER — OFFICE VISIT (OUTPATIENT)
Dept: SURGERY | Facility: CLINIC | Age: 58
End: 2023-05-31
Payer: COMMERCIAL

## 2023-05-31 DIAGNOSIS — Z94.5 HISTORY OF SKIN GRAFT: Primary | ICD-10-CM

## 2023-05-31 PROCEDURE — 99213 OFFICE O/P EST LOW 20 MIN: CPT | Performed by: SURGERY

## 2023-07-23 ENCOUNTER — WALK IN (OUTPATIENT)
Dept: URGENT CARE | Age: 58
End: 2023-07-23

## 2023-07-23 VITALS
RESPIRATION RATE: 20 BRPM | TEMPERATURE: 97.3 F | HEART RATE: 64 BPM | DIASTOLIC BLOOD PRESSURE: 90 MMHG | SYSTOLIC BLOOD PRESSURE: 138 MMHG | OXYGEN SATURATION: 99 %

## 2023-07-23 DIAGNOSIS — J32.9 CHRONIC SINUSITIS, UNSPECIFIED LOCATION: Primary | ICD-10-CM

## 2023-07-23 DIAGNOSIS — Z76.89 ENCOUNTER TO ESTABLISH CARE: ICD-10-CM

## 2023-07-23 PROCEDURE — 99214 OFFICE O/P EST MOD 30 MIN: CPT | Performed by: FAMILY MEDICINE

## 2023-07-23 RX ORDER — AZITHROMYCIN 250 MG/1
TABLET, FILM COATED ORAL
Qty: 6 TABLET | Refills: 0 | Status: SHIPPED | OUTPATIENT
Start: 2023-07-23 | End: 2023-07-28

## 2023-07-23 ASSESSMENT — ENCOUNTER SYMPTOMS
HEMOPTYSIS: 0
CONSTIPATION: 0
DIARRHEA: 0
RHINORRHEA: 1
FEVER: 0
CHILLS: 0
SHORTNESS OF BREATH: 0
SORE THROAT: 0
FATIGUE: 0
SINUS PAIN: 1
VOMITING: 0
WHEEZING: 0
NAUSEA: 0
SINUS PRESSURE: 1
COUGH: 1
SWEATS: 0
ABDOMINAL PAIN: 0
HEADACHES: 0

## 2023-08-22 ENCOUNTER — TELEPHONE (OUTPATIENT)
Dept: FAMILY MEDICINE | Age: 58
End: 2023-08-22

## 2025-03-30 ENCOUNTER — WALK IN (OUTPATIENT)
Dept: URGENT CARE | Age: 60
End: 2025-03-30

## 2025-03-30 VITALS
OXYGEN SATURATION: 97 % | DIASTOLIC BLOOD PRESSURE: 70 MMHG | SYSTOLIC BLOOD PRESSURE: 108 MMHG | HEART RATE: 78 BPM | TEMPERATURE: 97.2 F | RESPIRATION RATE: 18 BRPM

## 2025-03-30 DIAGNOSIS — Z91.09 ENVIRONMENTAL ALLERGIES: ICD-10-CM

## 2025-03-30 DIAGNOSIS — J32.9 SINUSITIS, UNSPECIFIED CHRONICITY, UNSPECIFIED LOCATION: Primary | ICD-10-CM

## 2025-03-30 DIAGNOSIS — R05.1 ACUTE COUGH: ICD-10-CM

## 2025-03-30 DIAGNOSIS — R04.0 EPISTAXIS: ICD-10-CM

## 2025-03-30 RX ORDER — PREDNISONE 20 MG/1
20 TABLET ORAL DAILY
Qty: 5 TABLET | Refills: 0 | Status: SHIPPED | OUTPATIENT
Start: 2025-03-30 | End: 2025-04-04

## 2025-03-30 RX ORDER — PSEUDOEPHEDRINE HCL 30 MG/1
30 TABLET, FILM COATED ORAL EVERY 4 HOURS PRN
Qty: 24 TABLET | Refills: 0 | Status: SHIPPED | OUTPATIENT
Start: 2025-03-30

## 2025-03-30 RX ORDER — CEFUROXIME AXETIL 500 MG/1
500 TABLET ORAL 2 TIMES DAILY
Qty: 20 TABLET | Refills: 0 | Status: SHIPPED | OUTPATIENT
Start: 2025-03-30 | End: 2025-04-09

## (undated) DEVICE — STERILE SYNTHETIC POLYISOPRENE POWDER-FREE SURGICAL GLOVES WITH HYDROGEL COATING, SMOOTH FINISH, STRAIGHT FINGER: Brand: PROTEXIS

## (undated) DEVICE — MEGADYNE ELECTRODE ADULT PT RT

## (undated) DEVICE — ELECTRODE ESURG 2.75IN EZ CLN

## (undated) DEVICE — SHEET, DRAPE, SPLIT, STERILE: Brand: MEDLINE

## (undated) DEVICE — STANDARD HYPODERMIC NEEDLE,POLYPROPYLENE HUB: Brand: MONOJECT

## (undated) DEVICE — BLADE 24 SS SRG STRL

## (undated) DEVICE — SYRINGE 10ML LL TIP

## (undated) DEVICE — SHEET,DRAPE,40X58,STERILE: Brand: MEDLINE

## (undated) DEVICE — LAPAROTOMY SPONGE - RF AND X-RAY DETECTABLE PRE-WASHED: Brand: SITUATE

## (undated) DEVICE — SUT PROLENE 4-0 RB-1 8557H

## (undated) DEVICE — BANDAGE,GAUZE,BULKEE II,4.5"X4.1YD,STRL: Brand: MEDLINE

## (undated) DEVICE — Device

## (undated) DEVICE — STERILE POLYISOPRENE POWDER-FREE SURGICAL GLOVES: Brand: PROTEXIS

## (undated) DEVICE — SOLUTION  .9 1000ML BTL

## (undated) DEVICE — PROXIMATE SKIN STAPLERS (35 WIDE) CONTAINS 35 STAINLESS STEEL STAPLES (FIXED HEAD): Brand: PROXIMATE

## (undated) DEVICE — SUT VICRYL 4-0 RB-1 J304H

## (undated) DEVICE — 3M™ STERI-STRIP™ REINFORCED ADHESIVE SKIN CLOSURES, R1547, 1/2 IN X 4 IN (12 MM X 100 MM), 6 STRIPS/ENVELOPE: Brand: 3M™ STERI-STRIP™

## (undated) DEVICE — SUT MONOCRYL 4-0 P-3 Y494G

## (undated) DEVICE — #10 STERILE DISPOSABLE SCALPEL: Brand: DISPOSABLE SCALPEL

## (undated) DEVICE — SYRINGE 10ML LL CONTRL SYRINGE

## (undated) DEVICE — GEL AQUASONIC 100 20GR

## (undated) DEVICE — TOWEL SURG OR 17X30IN BLUE

## (undated) DEVICE — SUT CHROMIC GUT 5-0 RB-1 U202H

## (undated) DEVICE — SUT SILK 3-0 SH K832H

## (undated) DEVICE — ATTAHJA VAC WITH 22MM CONNECTR

## (undated) DEVICE — PREMIUM WET SKIN PREP TRAY: Brand: MEDLINE INDUSTRIES, INC.

## (undated) DEVICE — NON-ADHERENT PAD PREPACK: Brand: TELFA

## (undated) DEVICE — HEAD AND NECK CDS-LF: Brand: MEDLINE INDUSTRIES, INC.

## (undated) DEVICE — SLEEVE KENDALL SCD EXPRESS MED

## (undated) DEVICE — SUT VICRYL 3-0 SH J416H

## (undated) DEVICE — PROVE COVER: Brand: UNBRANDED

## (undated) DEVICE — OCCLUSIVE GAUZE STRIP OVERWRAP,3% BISMUTH TRIBROMOPHENATE IN PETROLATUM BLEND: Brand: XEROFORM

## (undated) DEVICE — CLOSURE EXOFIN 1.0ML

## (undated) DEVICE — MEGADYNE E-Z CLEAN BLADE 2.75"

## (undated) DEVICE — ELECTRODE EDGE PENCIL 10FT

## (undated) DEVICE — MEDI-VAC SUCTION HANDLE REGULAR CAPACITY: Brand: CARDINAL HEALTH

## (undated) NOTE — LETTER
To Whom It May Concern:    Vashti Garcia has been under our care regarding ongoing medical issues. Because of this, she has been required to restrict her physical activities. She may resume her usual activities, including work, on 10/28/22 with the following restrictions:    [x]  None     []    No heavy lifting (over 15 pounds) for               weeks   []    Part-time (no more than             hours per week) for               week   []  Other:        Please feel free to contact us if there are any questions.       Sincerely,      Marcial Grider PA-C  [facility]  [address] [place] [address]  [place] [address] [phone]

## (undated) NOTE — LETTER
OUTSIDE TESTING RESULT REQUEST     IMPORTANT: FOR YOUR IMMEDIATE ATTENTION  Please FAX all test results listed below to: [phone]     Testing already done on or about: 22     * * * * If testing is NOT complete, arrange with patient A.S.A.P. * * * *      Patient Name: Cata Mayorga  Surgery Date: 2022  CSN: [id number]  Medical Record: [id number]   : 1965 - A: 61 y      Sex: female  Surgeon(s):  MD Austen Dyer MD  Procedure:  Wide excision melanoma left foot (Dr. Carla Henderson) Reconstruction of left medial plantar foot with local tissue rearrangement, flap, possible skin graft, possible integra (Dr. Michael Bronson)  Anesthesia Type: Regional     Surgeon: Paulette Bey MD     The following Testing and Time Line are REQUIRED PER ANESTHESIA     EKG READ AND SIGNED WITHIN   90 days      Thank You,   Sent by:Veronica

## (undated) NOTE — LETTER
Patient Name: Lily Calderón        : 1965       Medical Record #: [id number]    CONSENT FOR PROCEDURES/SEDATION    Date: 10/26/2022       Time: 1:59 PM        1. I authorize the performance upon Lily Calderón the following:    ____________punch biopsy of skin lesion left foot____________    2. I authorize Dr. Rosa Salazar and Kelsi Pwoell PA-C, to perform the above mentioned procedures. 3. If any unforeseen conditions arise during this procedure calling for additional procedures, operations, or medications (including anesthesia and blood transfusion), I  further request and authorize the doctor to do whatever he/she deems advisable in my interest.    4. I consent to the taking and reproduction of any photographs in the course of this procedure for professional purposes. 5. I consent to the administration of such sedation as may be considered necessary or advisable by the physician responsible for this service, with the exception of  _____________________________. 6. I have been informed by my doctor of the nature and purpose of this procedure/sedation, possible alternative methods of treatment, risk involved and possible complications.       Signature of Patient:  ___________________________    Signature of person authorized to consent for patient: Relationship to patient:  ___________________________    ___________________    Witness: ____________________     Date: ______________

## (undated) NOTE — LETTER
[facility]  [address]  [place] [address]  PH: [phone]  FAX: [phone]    Medical Clearance Request      No Recipients    The patient listed below is scheduled for surgery and needs the following pre-op labs and/or clearance prior to surgery. The patient has been instructed to schedule an appointment with you for a pre-op physical.      Patient: Ketan Benjamin  : 1965    Surgeon:  Dr. Imani Solorzano    Procedure: Wide excision melanoma left foot    Surgery Date:  TBD  Location:  [facility]    outpatient    [] Hematocrit/Hemogram [x] EKG     [x] CBC    [] Chest X-Ray    [x] CMP    [] PT/PTT    [] Urinalysis   [] MRSA Nasal Culture    [] Urinalysis with reflex  [] History and Physical    [] Urine pregnancy  [x] Medical Clearance    [] Qualitative HCG (blood) [] Cardiac Clearance      Please fax to fax number indicated above when completed. Call [phone] with any questions. Thank you for your prompt attention to these requirements.     [facility]